# Patient Record
Sex: FEMALE | Race: WHITE | NOT HISPANIC OR LATINO | URBAN - METROPOLITAN AREA
[De-identification: names, ages, dates, MRNs, and addresses within clinical notes are randomized per-mention and may not be internally consistent; named-entity substitution may affect disease eponyms.]

---

## 2022-11-09 ENCOUNTER — OFFICE VISIT (OUTPATIENT)
Dept: FAMILY MEDICINE CLINIC | Facility: CLINIC | Age: 82
End: 2022-11-09

## 2022-11-09 VITALS
RESPIRATION RATE: 14 BRPM | OXYGEN SATURATION: 98 % | DIASTOLIC BLOOD PRESSURE: 82 MMHG | HEART RATE: 62 BPM | HEIGHT: 62 IN | SYSTOLIC BLOOD PRESSURE: 136 MMHG | TEMPERATURE: 98.2 F | BODY MASS INDEX: 26.43 KG/M2 | WEIGHT: 143.6 LBS

## 2022-11-09 DIAGNOSIS — Z00.00 MEDICARE ANNUAL WELLNESS VISIT, SUBSEQUENT: Primary | ICD-10-CM

## 2022-11-09 DIAGNOSIS — E78.5 HYPERLIPIDEMIA, UNSPECIFIED HYPERLIPIDEMIA TYPE: ICD-10-CM

## 2022-11-09 DIAGNOSIS — Z13.6 SCREENING FOR CARDIOVASCULAR CONDITION: ICD-10-CM

## 2022-11-09 DIAGNOSIS — R32 URINARY INCONTINENCE, UNSPECIFIED TYPE: ICD-10-CM

## 2022-11-09 RX ORDER — BROMFENAC 1.03 MG/ML
SOLUTION/ DROPS OPHTHALMIC
COMMUNITY
Start: 2022-10-22

## 2022-11-09 RX ORDER — PANTOPRAZOLE SODIUM 40 MG/1
40 TABLET, DELAYED RELEASE ORAL
COMMUNITY
Start: 2022-09-11

## 2022-11-09 RX ORDER — SIMVASTATIN 40 MG
40 TABLET ORAL DAILY
COMMUNITY
Start: 2022-09-11

## 2022-11-09 RX ORDER — LISINOPRIL 20 MG/1
20 TABLET ORAL DAILY
COMMUNITY
Start: 2022-09-11 | End: 2023-05-10 | Stop reason: SDUPTHER

## 2022-11-09 NOTE — PROGRESS NOTES
Assessment and Plan:     Problem List Items Addressed This Visit    None     Visit Diagnoses     Medicare annual wellness visit, subsequent    -  Primary    Screening for cardiovascular condition        Relevant Orders    CBC    Comprehensive metabolic panel    UA (URINE) with reflex to Scope    Hyperlipidemia, unspecified hyperlipidemia type        Relevant Orders    Lipid panel    Urinary incontinence, unspecified type            Medicare wellness visit completed   Labs ordered  She is up-to-date on health maintenance she is declining osteoporosis screening   She states she is up-to-date on her vaccines   She can follow up in 6 months or sooner if needed      BMI Counseling: Body mass index is 26 52 kg/m²  Follow-up plan was not completed due to elderly patient (70 years old) where weight reduction/weight gain would complicate underlying health condition such as: illness or physical disability  Depression Screening and Follow-up Plan: Patient was screened for depression during today's encounter  They screened negative with a PHQ-2 score of 0  Urinary Incontinence Plan of Care: counseling topics discussed: practice Kegel (pelvic floor strengthening) exercises  Preventive health issues were discussed with patient, and age appropriate screening tests were ordered as noted in patient's After Visit Summary  Personalized health advice and appropriate referrals for health education or preventive services given if needed, as noted in patient's After Visit Summary       History of Present Illness:     Patient presents for a Medicare Wellness Visit    Patient presents today to establish   She states she is due for her Medicare wellness visit as well   She has no acute complaints today she we do not have any old records to review but she says she has very minimal past medical history she had   Eye surgery at one point but otherwise has no other issues today     Patient Care Team:  Pebbles Roberts DO as PCP - General (Family Medicine)     Review of Systems:     Review of Systems   Constitutional: Negative  HENT: Negative  Eyes: Negative  Respiratory: Negative  Cardiovascular: Negative  Gastrointestinal: Negative  Endocrine: Negative  Genitourinary: Negative  Musculoskeletal: Negative  Skin: Negative  Allergic/Immunologic: Negative  Neurological: Negative  Hematological: Negative  Psychiatric/Behavioral: Negative  All other systems reviewed and are negative  Problem List:     There is no problem list on file for this patient  Past Medical and Surgical History:     History reviewed  No pertinent past medical history  History reviewed  No pertinent surgical history  Family History:     History reviewed  No pertinent family history  Social History:     Social History     Socioeconomic History   • Marital status:      Spouse name: None   • Number of children: None   • Years of education: None   • Highest education level: None   Occupational History   • None   Tobacco Use   • Smoking status: Former Smoker     Quit date:      Years since quittin 8   • Smokeless tobacco: Never Used   Vaping Use   • Vaping Use: Never used   Substance and Sexual Activity   • Alcohol use: Yes     Comment: 1 glass of wine a day   • Drug use: Never   • Sexual activity: None   Other Topics Concern   • None   Social History Narrative   • None     Social Determinants of Health     Financial Resource Strain: Low Risk    • Difficulty of Paying Living Expenses: Not very hard   Food Insecurity: Not on file   Transportation Needs: No Transportation Needs   • Lack of Transportation (Medical): No   • Lack of Transportation (Non-Medical): No   Physical Activity: Not on file   Stress: Not on file   Social Connections: Not on file   Intimate Partner Violence: Not At Risk   • Fear of Current or Ex-Partner: No   • Emotionally Abused: No   • Physically Abused: No   • Sexually Abused:  No Housing Stability: Not on file      Medications and Allergies:     Current Outpatient Medications   Medication Sig Dispense Refill   • Bromfenac Sodium, Once-Daily, 0 09 % SOLN INSTILL 1 DROP INTO RIGHT EYE ONCE A DAY     • lisinopril (ZESTRIL) 20 mg tablet Take 20 mg by mouth daily     • pantoprazole (PROTONIX) 40 mg tablet Take 40 mg by mouth daily before breakfast 30 minutes prior     • simvastatin (ZOCOR) 40 mg tablet Take 40 mg by mouth daily       No current facility-administered medications for this visit  No Known Allergies   Immunizations: There is no immunization history on file for this patient  Health Maintenance: There are no preventive care reminders to display for this patient  Topic Date Due   • COVID-19 Vaccine (1) Never done   • Pneumococcal Vaccine: 65+ Years (1 - PCV) Never done   • Influenza Vaccine (1) Never done      Medicare Screening Tests and Risk Assessments:     Elizabeth Grover is here for her Subsequent Wellness visit  Last Medicare Wellness visit information reviewed, patient interviewed and updates made to the record today  Health Risk Assessment:   Patient rates overall health as good  Patient feels that their physical health rating is same  Patient is satisfied with their life  Eyesight was rated as same  Hearing was rated as same  Patient feels that their emotional and mental health rating is same  Patients states they are never, rarely angry  Patient states they are never, rarely unusually tired/fatigued  Pain experienced in the last 7 days has been none  Patient states that she has experienced no weight loss or gain in last 6 months  Depression Screening:   PHQ-2 Score: 0      Fall Risk Screening: In the past year, patient has experienced: no history of falling in past year      Urinary Incontinence Screening:   Patient has not leaked urine accidently in the last six months       Home Safety:  Patient does not have trouble with stairs inside or outside of their home  Patient has working smoke alarms and has working carbon monoxide detector  Home safety hazards include: none  Nutrition:   Current diet is Regular  Medications:   Patient is currently taking over-the-counter supplements  OTC medications include: see medication list  Patient is able to manage medications  Activities of Daily Living (ADLs)/Instrumental Activities of Daily Living (IADLs):   Walk and transfer into and out of bed and chair?: Yes  Dress and groom yourself?: Yes    Bathe or shower yourself?: Yes    Feed yourself? Yes  Do your laundry/housekeeping?: Yes  Manage your money, pay your bills and track your expenses?: Yes  Make your own meals?: Yes    Do your own shopping?: Yes    Previous Hospitalizations:   Any hospitalizations or ED visits within the last 12 months?: No      Advance Care Planning:   Living will: Yes    Durable POA for healthcare:  Yes    Advanced directive: Yes    Advanced directive counseling given: Yes    Five wishes given: No    End of Life Decisions reviewed with patient: Yes    Provider agrees with end of life decisions: Yes      Cognitive Screening:   Provider or family/friend/caregiver concerned regarding cognition?: No    PREVENTIVE SCREENINGS      Cardiovascular Screening:    General: History Lipid Disorder and Risks and Benefits Discussed    Due for: Lipid Panel      Diabetes Screening:     General: Risks and Benefits Discussed    Due for: Blood Glucose      Colorectal Cancer Screening:     General: Screening Not Indicated      Breast Cancer Screening:     General: Screening Not Indicated      Cervical Cancer Screening:    General: Screening Not Indicated      Osteoporosis Screening:    General: Patient Declines      Abdominal Aortic Aneurysm (AAA) Screening:        General: Screening Not Indicated      Lung Cancer Screening:     General: Screening Not Indicated      Hepatitis C Screening:    General: Screening Not Indicated    Screening, Brief Intervention, and Referral to Treatment (SBIRT)    Screening    Typical number of drinks in a week: 6    Single Item Drug Screening:  How often have you used an illegal drug (including marijuana) or a prescription medication for non-medical reasons in the past year? never    Single Item Drug Screen Score: 0  Interpretation: Negative screen for possible drug use disorder    Brief Intervention  Alcohol & drug use screenings were reviewed  No concerns regarding substance use disorder identified  Other Counseling Topics:   Car/seat belt/driving safety, skin self-exam, sunscreen and regular weightbearing exercise and calcium and vitamin D intake  Visual Acuity Screening    Right eye Left eye Both eyes   Without correction:      With correction: 20/200 20/40 20/40        Physical Exam:     /82 (BP Location: Left arm, Patient Position: Sitting, Cuff Size: Large)   Pulse 62   Temp 98 2 °F (36 8 °C)   Resp 14   Ht 5' 1 7" (1 567 m)   Wt 65 1 kg (143 lb 9 6 oz)   SpO2 98%   BMI 26 52 kg/m²     Physical Exam  Constitutional:       Appearance: She is well-developed  HENT:      Head: Normocephalic  Right Ear: External ear normal       Left Ear: External ear normal       Nose: Nose normal    Eyes:      Conjunctiva/sclera: Conjunctivae normal       Pupils: Pupils are equal, round, and reactive to light  Cardiovascular:      Rate and Rhythm: Normal rate and regular rhythm  Heart sounds: Normal heart sounds  Pulmonary:      Effort: Pulmonary effort is normal       Breath sounds: Normal breath sounds  Abdominal:      General: Bowel sounds are normal       Palpations: Abdomen is soft  Musculoskeletal:         General: Normal range of motion  Cervical back: Normal range of motion and neck supple  Skin:     General: Skin is warm and dry  Neurological:      Mental Status: She is alert and oriented to person, place, and time     Psychiatric:         Behavior: Behavior normal          Thought Content: Thought content normal          Judgment: Judgment normal           Pierce Page, DO  BMI Counseling: Body mass index is 26 52 kg/m²  The BMI is above normal  No BMI follow-up plan is appropriate  Patient is 70 years old and weight reduction/weight gain would further complicate their underlying illness

## 2022-11-15 LAB
ALBUMIN SERPL-MCNC: 4.4 G/DL (ref 3.6–4.6)
ALBUMIN/GLOB SERPL: 2 {RATIO} (ref 1.2–2.2)
ALP SERPL-CCNC: 73 IU/L (ref 44–121)
ALT SERPL-CCNC: 13 IU/L (ref 0–32)
APPEARANCE UR: CLEAR
AST SERPL-CCNC: 28 IU/L (ref 0–40)
BACTERIA URNS QL MICRO: NORMAL
BILIRUB SERPL-MCNC: 0.4 MG/DL (ref 0–1.2)
BILIRUB UR QL STRIP: NEGATIVE
BUN SERPL-MCNC: 29 MG/DL (ref 8–27)
BUN/CREAT SERPL: 21 (ref 12–28)
CALCIUM SERPL-MCNC: 10.5 MG/DL (ref 8.7–10.3)
CASTS URNS QL MICRO: NORMAL /LPF
CHLORIDE SERPL-SCNC: 108 MMOL/L (ref 96–106)
CHOLEST SERPL-MCNC: 185 MG/DL (ref 100–199)
CHOLEST/HDLC SERPL: 2.2 RATIO (ref 0–4.4)
CO2 SERPL-SCNC: 24 MMOL/L (ref 20–29)
COLOR UR: YELLOW
CREAT SERPL-MCNC: 1.35 MG/DL (ref 0.57–1)
EGFR: 39 ML/MIN/1.73
EPI CELLS #/AREA URNS HPF: NORMAL /HPF (ref 0–10)
ERYTHROCYTE [DISTWIDTH] IN BLOOD BY AUTOMATED COUNT: 12.3 % (ref 11.7–15.4)
GLOBULIN SER-MCNC: 2.2 G/DL (ref 1.5–4.5)
GLUCOSE SERPL-MCNC: 100 MG/DL (ref 70–99)
GLUCOSE UR QL: NEGATIVE
HCT VFR BLD AUTO: 32.4 % (ref 34–46.6)
HDLC SERPL-MCNC: 84 MG/DL
HGB BLD-MCNC: 11.2 G/DL (ref 11.1–15.9)
HGB UR QL STRIP: NEGATIVE
KETONES UR QL STRIP: NEGATIVE
LDLC SERPL CALC-MCNC: 88 MG/DL (ref 0–99)
LEUKOCYTE ESTERASE UR QL STRIP: ABNORMAL
MCH RBC QN AUTO: 30.5 PG (ref 26.6–33)
MCHC RBC AUTO-ENTMCNC: 34.6 G/DL (ref 31.5–35.7)
MCV RBC AUTO: 88 FL (ref 79–97)
MICRO URNS: ABNORMAL
NITRITE UR QL STRIP: NEGATIVE
PH UR STRIP: 6 [PH] (ref 5–7.5)
PLATELET # BLD AUTO: 209 X10E3/UL (ref 150–450)
POTASSIUM SERPL-SCNC: 5.4 MMOL/L (ref 3.5–5.2)
PROT SERPL-MCNC: 6.6 G/DL (ref 6–8.5)
PROT UR QL STRIP: NEGATIVE
RBC # BLD AUTO: 3.67 X10E6/UL (ref 3.77–5.28)
RBC #/AREA URNS HPF: NORMAL /HPF (ref 0–2)
SL AMB VLDL CHOLESTEROL CALC: 13 MG/DL (ref 5–40)
SODIUM SERPL-SCNC: 142 MMOL/L (ref 134–144)
SP GR UR: 1.01 (ref 1–1.03)
TRIGL SERPL-MCNC: 70 MG/DL (ref 0–149)
UROBILINOGEN UR STRIP-ACNC: 0.2 MG/DL (ref 0.2–1)
WBC # BLD AUTO: 4.8 X10E3/UL (ref 3.4–10.8)
WBC #/AREA URNS HPF: NORMAL /HPF (ref 0–5)

## 2023-05-10 ENCOUNTER — OFFICE VISIT (OUTPATIENT)
Dept: FAMILY MEDICINE CLINIC | Facility: CLINIC | Age: 83
End: 2023-05-10

## 2023-05-10 VITALS
HEART RATE: 59 BPM | WEIGHT: 141.2 LBS | HEIGHT: 62 IN | RESPIRATION RATE: 14 BRPM | BODY MASS INDEX: 25.98 KG/M2 | DIASTOLIC BLOOD PRESSURE: 88 MMHG | OXYGEN SATURATION: 98 % | SYSTOLIC BLOOD PRESSURE: 138 MMHG | TEMPERATURE: 98 F

## 2023-05-10 DIAGNOSIS — I10 BENIGN ESSENTIAL HYPERTENSION: ICD-10-CM

## 2023-05-10 DIAGNOSIS — E78.2 MIXED HYPERLIPIDEMIA: Primary | ICD-10-CM

## 2023-05-10 DIAGNOSIS — N18.30 STAGE 3 CHRONIC KIDNEY DISEASE, UNSPECIFIED WHETHER STAGE 3A OR 3B CKD (HCC): ICD-10-CM

## 2023-05-10 DIAGNOSIS — M54.31 SCIATICA OF RIGHT SIDE: ICD-10-CM

## 2023-05-10 PROBLEM — Z01.818 PRE-OP EVALUATION: Status: ACTIVE | Noted: 2020-09-29

## 2023-05-10 PROBLEM — R73.01 FASTING HYPERGLYCEMIA: Status: ACTIVE | Noted: 2020-07-02

## 2023-05-10 PROBLEM — Z00.00 ENCOUNTER FOR MEDICARE ANNUAL WELLNESS EXAM: Status: ACTIVE | Noted: 2021-08-03

## 2023-05-10 PROBLEM — M18.0 PRIMARY OSTEOARTHRITIS OF BOTH FIRST CARPOMETACARPAL JOINTS: Status: ACTIVE | Noted: 2019-12-30

## 2023-05-10 PROBLEM — H40.9 GLAUCOMA: Status: ACTIVE | Noted: 2018-10-30

## 2023-05-10 PROBLEM — M54.50 LOW BACK PAIN: Status: ACTIVE | Noted: 2019-11-18

## 2023-05-10 RX ORDER — LISINOPRIL 20 MG/1
20 TABLET ORAL DAILY
Qty: 90 TABLET | Refills: 1 | Status: SHIPPED | OUTPATIENT
Start: 2023-05-10

## 2023-05-10 NOTE — PROGRESS NOTES
"    Assessment/Plan:       Diagnoses and all orders for this visit:    Mixed hyperlipidemia    Benign essential hypertension  -     lisinopril (ZESTRIL) 20 mg tablet; Take 1 tablet (20 mg total) by mouth daily    Sciatica of right side  -     Ambulatory Referral to Physical Therapy; Future    Stage 3 chronic kidney disease, unspecified whether stage 3a or 3b CKD ([facility] [address] )        Overall patient is doing well she will continue current medications  Patient referred to physical therapy for her sciatic issues  Her other chronic conditions are stable and she can follow-up in 6 months for Medicare wellness visit or sooner if needed    Subjective:     Chief Complaint   Patient presents with   • Follow-up     6 month        Patient ID: Carlos Vaughn is a 78 y o  female  Patient presents today for 6-month checkup  She is having some sciatic type pain that starts in her right buttock and radiates down her leg  Otherwise her other chronic conditions are stable and she has no other complaints      The following portions of the patient's history were reviewed and updated as appropriate: allergies, current medications, past family history, past medical history, past social history, past surgical history and problem list     Review of Systems   Constitutional: Negative  HENT: Negative  Eyes: Negative  Respiratory: Negative  Cardiovascular: Negative  Gastrointestinal: Negative  Endocrine: Negative  Genitourinary: Negative  Musculoskeletal: Negative  Skin: Negative  Allergic/Immunologic: Negative  Neurological: Negative  Hematological: Negative  Psychiatric/Behavioral: Negative  All other systems reviewed and are negative          Objective:    Vitals:    05/10/23 1006   BP: 138/88   BP Location: Left arm   Patient Position: Sitting   Cuff Size: Standard   Pulse: 59   Resp: 14   Temp: 98 °F (36 7 °C)   SpO2: 98%   Weight: 64 kg (141 lb 3 2 oz)   Height: 5' 1 7\" (1 567 m)          Physical " Exam  Vitals and nursing note reviewed  Constitutional:       Appearance: She is well-developed  HENT:      Head: Normocephalic and atraumatic  Right Ear: External ear normal       Left Ear: External ear normal    Eyes:      Conjunctiva/sclera: Conjunctivae normal       Pupils: Pupils are equal, round, and reactive to light  Cardiovascular:      Rate and Rhythm: Normal rate and regular rhythm  Heart sounds: Normal heart sounds  Pulmonary:      Effort: Pulmonary effort is normal       Breath sounds: Normal breath sounds  Abdominal:      General: Bowel sounds are normal       Palpations: Abdomen is soft  Musculoskeletal:         General: Normal range of motion  Cervical back: Normal range of motion  Skin:     General: Skin is warm and dry  Neurological:      Mental Status: She is alert and oriented to person, place, and time  Deep Tendon Reflexes: Reflexes are normal and symmetric  Psychiatric:         Behavior: Behavior normal          Thought Content:  Thought content normal          Judgment: Judgment normal

## 2023-05-26 ENCOUNTER — EVALUATION (OUTPATIENT)
Dept: PHYSICAL THERAPY | Facility: CLINIC | Age: 83
End: 2023-05-26

## 2023-05-26 DIAGNOSIS — M54.31 SCIATICA OF RIGHT SIDE: Primary | ICD-10-CM

## 2023-05-26 NOTE — PROGRESS NOTES
PT Evaluation     Today's date: 2023  Patient name: Beth Kamara  : 1940  MRN: [id number]  Referring provider: Teodoro Dietz DO  Dx:   Encounter Diagnosis     ICD-10-CM    1  Sciatica of right side  M54 31 Ambulatory Referral to Physical Therapy                     Assessment  Assessment details: Beth Kamara is a 78 y o  female who presents with pain, decreased strength, decreased ROM, decreased joint mobility and ambulatory dysfunction  Due to these impairments, patient has difficulty performing a/iadls, recreational activities and engaging in social activities  Patient's clinical presentation is consistent with their referring diagnosis of Sciatica of right side  Patient has been educated in home exercise program and plan of care  Patient would benefit from skilled physical therapy services to address their aforementioned functional limitations and progress towards prior level of function and independence with home exercise program    Impairments: abnormal gait, abnormal muscle firing, abnormal or restricted ROM, activity intolerance, impaired physical strength, lacks appropriate home exercise program, pain with function, weight-bearing intolerance and poor body mechanics    Symptom irritability: lowUnderstanding of Dx/Px/POC: good   Prognosis: good    Goals  Short Term Goals: Target Date 4 weeks  1  Pt will initiate and advance HEP  2  Pt will have < 3/10 pain  3  Pt will have full arom of the l/s with out pain  4  Pt will be able to sit to stand with out difficulty    Long Term Goals: Target Date 8 weeks  1  Pt will demonstrate independence in HEP  2  Pt will have <1/10 pain  3  Pt will have full core and B LE strength  4   Pt will be able to negotiate stairs with out difficulty      Plan  Patient would benefit from: skilled PT  Planned modality interventions: cryotherapy and thermotherapy: hydrocollator packs  Planned therapy interventions: joint mobilization, manual therapy, patient education, postural training, activity modification, abdominal trunk stabilization, body mechanics training, flexibility, functional ROM exercises, graded exercise, home exercise program, neuromuscular re-education, strengthening, stretching, therapeutic activities, therapeutic exercise, motor coordination training, balance/weight bearing training and ADL training  Frequency: 1x week  Duration in weeks: 8  Plan of Care beginning date: 2023  Plan of Care expiration date: 2023  Treatment plan discussed with: patient        Subjective Evaluation    History of Present Illness  Mechanism of injury: Pt notes that she has been having right gluteal pain for about 1 month  She notes that there was no injury  She does get some l/s pain ,but mainly gluteal pain that radiates down the post lateral leg to just below the knee  Pt notes that when she is sitting no pain  Worst pain is with transition to standing after sitting, and then when doing stair ascent  Pt notes that she does still going to the gym 3 days per week for 30 min ea  Has had to modify her activity b/c of pain, but still able to go  Pt notes that sleeping is not disturbed  No changes to b/b behavior  Pain  Current pain ratin  At best pain ratin  At worst pain ratin  Location: right glute, l/s  Quality: radiating, discomfort, sharp and dull ache    Patient Goals  Patient goals for therapy: decreased pain, increased motion, independence with ADLs/IADLs, increased strength and return to sport/leisure activities  Patient goal: return to gym with out pain        Objective     Concurrent Complaints  Negative for night pain, disturbed sleep, bladder dysfunction, bowel dysfunction, saddle (S4) numbness, cardiac problem, kidney problem, gallbladder problem, stomach problem, ulcer, appendix problem, spleen problem, pancreas problem, history of cancer, history of trauma and infection    Static Posture     Lumbar Spine   Lumbar spine (Left): Rotated       Hip   Hip (Left): Internally rotated  Hip (Right): Externally rotated  Active Range of Motion     Lumbar   Flexion:  WFL  Extension:  WFL  Left lateral flexion:  Restriction level: maximal  Right lateral flexion:  Restriction level: maximal  Left rotation:  WFL  Right rotation:  WFL and with pain    Strength/Myotome Testing     Left Hip   Planes of Motion   Flexion: 4-    Right Hip   Planes of Motion   Flexion: 4-    Left Knee   Flexion: 4+  Extension: 4+    Right Knee   Flexion: 4+  Extension: 4+    Left Ankle/Foot   Dorsiflexion: 4+  Plantar flexion: 4    Right Ankle/Foot   Dorsiflexion: 4+  Plantar flexion: 4    Muscle Activation   Patient able to activate left transverse abdominals, left external obliques, left internal obliques, left multifidus, right transverse abdominals, right external obliques, right internal obliques and right multifidus  Tests     Lumbar   Positive prone instability   Left   Negative crossed SLR, passive SLR and slump test      Right   Negative crossed SLR, passive SLR and slump test      Ambulation     Observational Gait   Gait: antalgic and asymmetric   Increased left stance time and right swing time  Decreased right stance time and left swing time  Functional Assessment      Squat    Sitting toward left side, left valgus and right valgus       Posterior weight shift: moderate    Depth of femur height: above 90 degrees             Precautions: none      Manuals 5/26            Right piriformis stm nv            L/s pa and rot mobs nv lv 2                                      Neuro Re-Ed             Ppt march Red nv            Ppt bridge nv            Ppt ball bridge nv            S/l clams nv                                                   Ther Ex             bike nv                                                                                                       Ther Activity                                       Gait Training Modalities

## 2023-06-02 ENCOUNTER — OFFICE VISIT (OUTPATIENT)
Dept: PHYSICAL THERAPY | Facility: CLINIC | Age: 83
End: 2023-06-02

## 2023-06-02 ENCOUNTER — APPOINTMENT (OUTPATIENT)
Dept: PHYSICAL THERAPY | Facility: CLINIC | Age: 83
End: 2023-06-02
Payer: COMMERCIAL

## 2023-06-02 DIAGNOSIS — M54.31 SCIATICA OF RIGHT SIDE: Primary | ICD-10-CM

## 2023-06-02 NOTE — PROGRESS NOTES
Daily Note     Today's date: 2023  Patient name: Ramos Garcia  : 1940  MRN: [id number]  Referring provider: Kerri Mclaughlin DO  Dx:   Encounter Diagnosis     ICD-10-CM    1  Sciatica of right side  M54 31                      Subjective: pt notes that she has been doing better  Her back pain has been less as well as the leg pain  She does note that her 20 min ride here did cause her to have some glute pain on the right side  Objective: See treatment diary below      Assessment: pt did well with all exercises  Will continue to progress with improved tolerance to stretching and strengthening of core and glutes      Plan: Continue per plan of care        Precautions: none      Manuals            Right piriformis stm nv DB           L/s pa and rot mobs nv lv 2 lv 2 DB                                      Neuro Re-Ed             Ppt march Red nv Red x10           Ppt bridge nv Red 2x10           Ppt ball bridge nv nv           S/l clams nv 3x10           Supine hip add  5''x15           Supine hip abd  Red 2x10                        Ther Ex             bike nv nv                                                                                                      Ther Activity                                       Gait Training                                       Modalities             HP  10'

## 2023-06-05 ENCOUNTER — OFFICE VISIT (OUTPATIENT)
Dept: PHYSICAL THERAPY | Facility: CLINIC | Age: 83
End: 2023-06-05
Payer: COMMERCIAL

## 2023-06-05 DIAGNOSIS — M54.31 SCIATICA OF RIGHT SIDE: Primary | ICD-10-CM

## 2023-06-05 PROCEDURE — 97112 NEUROMUSCULAR REEDUCATION: CPT

## 2023-06-05 PROCEDURE — 97140 MANUAL THERAPY 1/> REGIONS: CPT

## 2023-06-05 NOTE — PROGRESS NOTES
"Daily Note     Today's date: 2023  Patient name: Geovanna Marshall  : 1940  MRN: [id number]  Referring provider: Lebron Allred DO  Dx:   Encounter Diagnosis     ICD-10-CM    1  Sciatica of right side  M54 31                      Subjective: pt reports that her sxs are better, she still gets sxs when she gets out of car that go down her right leg takes a few minutes to centralize and they are not as intense as before      Objective: See treatment diary below      Assessment: Tolerated treatment with some cramping in hamstrings when performing bridges  Patient was able to perform exercises in good form otherwise  Did update HEP today  Pt was tender and tight in right glute and piriformis  Plan: Continue per plan of care        Precautions: none      Manuals           Right piriformis stm nv DB +glute-DL          L/s pa and rot mobs nv lv 2 lv 2 DB  nv                                    Neuro Re-Ed             Ppt march Red nv Red x10 Red 2x10          Ppt bridge nv Red 2x10 Red x10          Ppt ball bridge nv nv nv          S/l clams nv 3x10 3x10          Supine hip add  5''x15 5\" 2x10          Supine hip abd  Red 2x10 Red 2x10                       Ther Ex             bike nv nv           Piriformis stretch   20\"x5                                                                                        Ther Activity                                       Gait Training                                       Modalities             HP  10'                             "

## 2023-06-09 ENCOUNTER — APPOINTMENT (OUTPATIENT)
Dept: PHYSICAL THERAPY | Facility: CLINIC | Age: 83
End: 2023-06-09
Payer: COMMERCIAL

## 2023-06-12 ENCOUNTER — OFFICE VISIT (OUTPATIENT)
Dept: PHYSICAL THERAPY | Facility: CLINIC | Age: 83
End: 2023-06-12
Payer: COMMERCIAL

## 2023-06-12 DIAGNOSIS — M54.31 SCIATICA OF RIGHT SIDE: Primary | ICD-10-CM

## 2023-06-12 PROCEDURE — 97110 THERAPEUTIC EXERCISES: CPT

## 2023-06-12 PROCEDURE — 97112 NEUROMUSCULAR REEDUCATION: CPT

## 2023-06-12 PROCEDURE — 97140 MANUAL THERAPY 1/> REGIONS: CPT

## 2023-06-12 NOTE — PROGRESS NOTES
"Daily Note     Today's date: 2023  Patient name: Javier Multani  : 1940  MRN: [id number]  Referring provider: Hernando Vargas DO  Dx:   Encounter Diagnosis     ICD-10-CM    1  Sciatica of right side  M54 31           Start Time: 1142  Stop Time: 1227  Total time in clinic (min): 45 minutes    Subjective: Patient continues with mild radicular sxs during car transfers  Objective: See treatment diary below  Bike added this visit with good tolerance  Assessment: Tolerated treatment well  Moderate challenge with banded supine marches R>L, with weakness in interanal rotators/ adductors  Good response to manuals with improved mobility in gait post  Patient demonstrated fatigue post treatment, exhibited good technique with therapeutic exercises and would benefit from continued PT to improve LE strength and core stability  Plan: Continue per plan of care        Precautions: none      Manuals          Right piriformis stm nv DB +glute-DL LQ, glute, lumbar paraspinals         L/s pa and rot mobs nv lv 2 lv 2 DB  nv NV                                   Neuro Re-Ed             Ppt march Red nv Red x10 Red 2x10 Red 2x10         Ppt bridge nv Red 2x10 Red x10 Red 2x10         Ppt ball bridge nv nv nv          S/l clams nv 3x10 3x10 3x10ea         Supine hip add  5''x15 5\" 2x10 5\" 2x10         Supine hip abd  Red 2x10 Red 2x10 Red 2x10                      Ther Ex             bike nv nv  5 min         Piriformis stretch   20\"x5 20\"x5ea, and post SL clams                                                                                       Ther Activity                                       Gait Training                                       Modalities             HP  10'                             "

## 2023-06-16 ENCOUNTER — APPOINTMENT (OUTPATIENT)
Dept: PHYSICAL THERAPY | Facility: CLINIC | Age: 83
End: 2023-06-16
Payer: COMMERCIAL

## 2023-06-19 ENCOUNTER — OFFICE VISIT (OUTPATIENT)
Dept: PHYSICAL THERAPY | Facility: CLINIC | Age: 83
End: 2023-06-19
Payer: COMMERCIAL

## 2023-06-19 DIAGNOSIS — M54.31 SCIATICA OF RIGHT SIDE: Primary | ICD-10-CM

## 2023-06-19 PROCEDURE — 97112 NEUROMUSCULAR REEDUCATION: CPT

## 2023-06-19 PROCEDURE — 97140 MANUAL THERAPY 1/> REGIONS: CPT

## 2023-06-19 NOTE — PROGRESS NOTES
"Daily Note     Today's date: 2023  Patient name: Brittany Butler  : 1940  MRN: [id number]  Referring provider: Madelyn Smith DO  Dx:   Encounter Diagnosis     ICD-10-CM    1  Sciatica of right side  M54 31           Start Time:   Stop Time: 1230  Total time in clinic (min): 45 minutes    Subjective: Patient states that bike increased her sciatic sxs on R       Objective: See treatment diary below      Assessment: Tolerated treatment well  Session initiated with QL stretched and progressed to manuals  Good response to manuals with radicular sxs abolished  Bike held this visit Patient demonstrated fatigue post treatment, exhibited good technique with therapeutic exercises and would benefit from continued PT to improve core strength,      Plan: Continue per plan of care  Precautions: none      Manuals         Right piriformis stm nv DB +glute-DL LQ, glute, lumbar paraspinals LQ, glute        L/s pa and rot mobs nv lv 2 lv 2 DB  nv NV                                   Neuro Re-Ed             Ppt march Red nv Red x10 Red 2x10 Red 2x10 Red 2x10        Ppt bridge nv Red 2x10 Red x10 Red 2x10 Red 2x10        Ppt ball bridge nv nv nv          S/l clams nv 3x10 3x10 3x10ea 3x10ea        Supine hip add  5''x15 5\" 2x10 5\" 2x10 5\" 2x10        Supine hip abd  Red 2x10 Red 2x10 Red 2x10 Red 2x10                      Ther Ex             bike nv nv  5 min HOLD pain!         Piriformis stretch   20\"x5 20\"x5ea, and post SL clams 20\"x5ea, and post SL clams                                                                                      Ther Activity                                       Gait Training                                       Modalities             HP  10'                             "

## 2023-06-23 ENCOUNTER — APPOINTMENT (OUTPATIENT)
Dept: PHYSICAL THERAPY | Facility: CLINIC | Age: 83
End: 2023-06-23
Payer: COMMERCIAL

## 2023-06-26 ENCOUNTER — OFFICE VISIT (OUTPATIENT)
Dept: PHYSICAL THERAPY | Facility: CLINIC | Age: 83
End: 2023-06-26
Payer: COMMERCIAL

## 2023-06-26 DIAGNOSIS — M54.31 SCIATICA OF RIGHT SIDE: Primary | ICD-10-CM

## 2023-06-26 PROCEDURE — 97140 MANUAL THERAPY 1/> REGIONS: CPT

## 2023-06-26 PROCEDURE — 97110 THERAPEUTIC EXERCISES: CPT

## 2023-06-26 NOTE — PROGRESS NOTES
"Daily Note     Today's date: 2023  Patient name: Tim Calhoun  : 1940  MRN: [id number]  Referring provider: Lima Gutierrez DO  Dx:   Encounter Diagnosis     ICD-10-CM    1  Sciatica of right side  M54 31                      Subjective: pt is frustrated as she notes that HEP does help sxs but is short lived and still gets sxs often  Compliance is usually 1 time a day  As she has to lie down to perform and sometimes forgets    Objective: See treatment diary below      Assessment:Changed treatment slightly to include seated stretches for patient to perform throughout the day and in more accessible positions    Patient is to continue with her normal HEP 1 time a day and then add seated QL, piriformis and sciatic sliders several times throughout day  Will assess if performing more often through day helps with decreasing sxs and getting up out of chair with less sxs  Plan: Continue per plan of care  Precautions: none      Manuals        Right piriformis stm nv DB +glute-DL LQ, glute, lumbar paraspinals LQ, glute DL       L/s pa and rot mobs nv lv 2 lv 2 DB  nv NV                                   Neuro Re-Ed             Ppt march Red nv Red x10 Red 2x10 Red 2x10 Red 2x10        Ppt bridge nv Red 2x10 Red x10 Red 2x10 Red 2x10        Seated sciatic slider nv nv nv   x10       S/l clams nv 3x10 3x10 3x10ea 3x10ea        Supine hip add  5''x15 5\" 2x10 5\" 2x10 5\" 2x10        Supine hip abd  Red 2x10 Red 2x10 Red 2x10 Red 2x10         ppt      5\"x20       Ther Ex             bike nv nv  5 min HOLD pain!         Piriformis stretch   20\"x5 20\"x5ea, and post SL clams 20\"x5ea, and post SL clams 20\"x5 supine and seated       LTR      x10       seated QL stretch      15\"x2                                                           Ther Activity                                       Gait Training                                       Modalities             HP  10'                           "

## 2023-06-30 ENCOUNTER — APPOINTMENT (OUTPATIENT)
Dept: PHYSICAL THERAPY | Facility: CLINIC | Age: 83
End: 2023-06-30
Payer: COMMERCIAL

## 2023-07-09 PROBLEM — Z00.00 ENCOUNTER FOR MEDICARE ANNUAL WELLNESS EXAM: Status: RESOLVED | Noted: 2021-08-03 | Resolved: 2023-07-09

## 2023-07-10 ENCOUNTER — OFFICE VISIT (OUTPATIENT)
Dept: PHYSICAL THERAPY | Facility: CLINIC | Age: 83
End: 2023-07-10
Payer: COMMERCIAL

## 2023-07-10 DIAGNOSIS — M54.31 SCIATICA OF RIGHT SIDE: Primary | ICD-10-CM

## 2023-07-10 PROCEDURE — 97112 NEUROMUSCULAR REEDUCATION: CPT

## 2023-07-10 PROCEDURE — 97140 MANUAL THERAPY 1/> REGIONS: CPT

## 2023-07-10 PROCEDURE — 97110 THERAPEUTIC EXERCISES: CPT

## 2023-07-10 NOTE — PROGRESS NOTES
Daily Note     Today's date: 7/10/2023  Patient name: Maria Del Carmen Carbone  : 1940  MRN: [id number]  Referring provider: Emery Horne DO  Dx:   Encounter Diagnosis     ICD-10-CM    1. Sciatica of right side  M54.31                      Subjective: pt notes that she is feeling better with less buttock pain but still with some lateral thigh pain but unsure if that is from knee or back. She states that stairs are easier now also. Objective: See treatment diary below      Assessment: Tolerated treatment well with most issue when performing right piriformis stretch in sitting position as she notes some pain with this. Pt was better when having shorter hold time on this. . Patient would benefit from continued PT      Plan: Continue per plan of care. RA nv     Precautions: none      Manuals  7/10      Right piriformis stm nv DB +glute-DL LQ, glute, lumbar paraspinals LQ, glute DL + glute/lateral thigh-DL      L/s pa and rot mobs nv lv 2 lv 2 DB  nv NV                                   Neuro Re-Ed             Ppt march Red nv Red x10 Red 2x10 Red 2x10 Red 2x10  2x10      Ppt bridge nv Red 2x10 Red x10 Red 2x10 Red 2x10  Red 2x10      Seated sciatic slider nv nv nv   x10       S/l clams nv 3x10 3x10 3x10ea 3x10ea        Supine hip add  5''x15 5" 2x10 5" 2x10 5" 2x10  5"x20      Supine hip abd  Red 2x10 Red 2x10 Red 2x10 Red 2x10   Red 2x10      ppt      5"x20 5"x20      Ther Ex             bike nv nv  5 min HOLD pain!         Piriformis stretch   20"x5 20"x5ea, and post SL clams 20"x5ea, and post SL clams 20"x5 supine and seated Supine  20"x5, seated 10"x5      LTR      x10 x10      seated QL stretch      15"x2 20"x5                                                          Ther Activity                                       Gait Training                                       Modalities             HP  10'

## 2023-07-18 ENCOUNTER — EVALUATION (OUTPATIENT)
Dept: PHYSICAL THERAPY | Facility: CLINIC | Age: 83
End: 2023-07-18
Payer: COMMERCIAL

## 2023-07-18 DIAGNOSIS — M54.31 SCIATICA OF RIGHT SIDE: Primary | ICD-10-CM

## 2023-07-18 PROCEDURE — 97110 THERAPEUTIC EXERCISES: CPT | Performed by: PHYSICAL THERAPIST

## 2023-07-18 PROCEDURE — 97140 MANUAL THERAPY 1/> REGIONS: CPT | Performed by: PHYSICAL THERAPIST

## 2023-07-18 PROCEDURE — 97112 NEUROMUSCULAR REEDUCATION: CPT | Performed by: PHYSICAL THERAPIST

## 2023-07-18 NOTE — PROGRESS NOTES
PT Evaluation     Today's date: 2023  Patient name: Anyi Loyola  : 1940  MRN: [id number]  Referring provider: Radha Richardson DO  Dx:   Encounter Diagnosis     ICD-10-CM    1. Sciatica of right side  M54.31                      Assessment  Assessment details: Anyi Loyola has been compliant with attending PT and home exercise program since initial eval.  Reyes Olds  has made improvements in objective data since initial eval but is still limited compared to prior level of function. Reyes Olds continues with above listed impairments and would benefit from additional skilled PT to address these deficits to return to prior level of function. Impairments: abnormal gait, abnormal muscle firing, abnormal or restricted ROM, activity intolerance, impaired physical strength, lacks appropriate home exercise program, pain with function, weight-bearing intolerance and poor body mechanics    Symptom irritability: lowUnderstanding of Dx/Px/POC: good   Prognosis: good    Goals  Short Term Goals: Target Date 4 weeks  1. Pt will initiate and advance HEP. 2. Pt will have < 3/10 pain met  3. Pt will have full arom of the l/s with out pain met  4. Pt will be able to sit to stand with out difficulty met    Long Term Goals: Target Date 8 weeks  1. Pt will demonstrate independence in HEP. 2. Pt will have <1/10 pain not met  3. Pt will have full core and B LE strength in progress  4.  Pt will be able to negotiate stairs with out difficulty [id number] [facility]  Patient would benefit from: skilled PT  Planned modality interventions: cryotherapy and thermotherapy: hydrocollator packs  Planned therapy interventions: joint mobilization, manual therapy, patient education, postural training, activity modification, abdominal trunk stabilization, body mechanics training, flexibility, functional ROM exercises, graded exercise, home exercise program, neuromuscular re-education, strengthening, stretching, therapeutic activities, therapeutic exercise, motor coordination training, balance/weight bearing training and ADL training  Frequency: 1x week  Duration in weeks: 8  Plan of Care beginning date: 2023  Plan of Care expiration date: 2023  Treatment plan discussed with: patient        Subjective Evaluation    History of Present Illness  Mechanism of injury: Pt notes that pain freq and intensity have improved. But still some lateral right leg pain with transition to standing from prolonged sitting position. Patient Goals  Patient goals for therapy: decreased pain, increased motion, independence with ADLs/IADLs, increased strength and return to sport/leisure activities  Patient goal: return to gym with out pain  Pain  Current pain ratin  At best pain ratin  At worst pain rating: 3  Location: right glute, l/s  Quality: radiating, discomfort, sharp and dull ache          Objective     Concurrent Complaints  Negative for night pain, disturbed sleep, bladder dysfunction, bowel dysfunction, saddle (S4) numbness, cardiac problem, kidney problem, gallbladder problem, stomach problem, ulcer, appendix problem, spleen problem, pancreas problem, history of cancer, history of trauma and infection    Static Posture     Lumbar Spine   Lumbar spine (Left): Rotated. Hip   Hip (Left): Not internally rotated. Hip (Right): Not externally rotated.     Active Range of Motion     Lumbar   Flexion:  WFL  Extension:  WFL  Left lateral flexion:  Restriction level: moderate  Right lateral flexion:  Restriction level: moderate  Left rotation:  WFL  Right rotation:  [facility]    Strength/Myotome Testing     Left Hip   Planes of Motion   Flexion: 4-    Right Hip   Planes of Motion   Flexion: 4-    Left Knee   Flexion: 4+  Extension: 4+    Right Knee   Flexion: 4+  Extension: 4+    Left Ankle/Foot   Dorsiflexion: 4+  Plantar flexion: 4    Right Ankle/Foot   Dorsiflexion: 4+  Plantar flexion: 4    Muscle Activation   Patient able to activate left external obliques, left internal obliques, left multifidus, right external obliques, right internal obliques and right multifidus. Patient unable to activate left transverse abdominals and right transverse abdominals. Tests     Lumbar   Negative prone instability . Left   Negative crossed SLR, passive SLR and slump test.     Right   Negative crossed SLR, passive SLR and slump test.     Ambulation     Observational Gait   Left stance time, right stance time, left swing time and right swing time within functional limits. Functional Assessment      Squat    Left valgus and right valgus. Not sitting toward left side. Posterior weight shift: moderate    Depth of femur height: to 90 degrees             Precautions: none    Manuals 5/26 6/2 6/5 6/12 6/19 6/26 7/10 7/18     Right piriformis stm nv DB +glute-DL LQ, glute, lumbar paraspinals LQ, glute DL + glute/lateral thigh-DL DB + glute     L/s pa and rot mobs nv lv 2 lv 2 DB  nv NV                                   Neuro Re-Ed             Ppt march Red nv Red x10 Red 2x10 Red 2x10 Red 2x10  2x10 Red 2x10     Ppt bridge nv Red 2x10 Red x10 Red 2x10 Red 2x10  Red 2x10 Red 2x10     Ball bridge        2x10      S/l clams nv 3x10 3x10 3x10ea 3x10ea        Supine hip add  5''x15 5" 2x10 5" 2x10 5" 2x10  5"x20      Supine hip abd  Red 2x10 Red 2x10 Red 2x10 Red 2x10   Red 2x10 S/l red 2x10     ppt      5"x20 5"x20      Ther Ex             bike nv nv  5 min HOLD pain!         Piriformis stretch   20"x5 20"x5ea, and post SL clams 20"x5ea, and post SL clams 20"x5 supine and seated Supine  20"x5, seated 10"x5 supine 20''x5 seated 10''x5     LTR      x10 x10 supine ql str     seated QL stretch      15"x2 20"x5                                                          Ther Activity                                       Gait Training                                       Modalities             HP  10'

## 2023-07-25 ENCOUNTER — APPOINTMENT (OUTPATIENT)
Dept: PHYSICAL THERAPY | Facility: CLINIC | Age: 83
End: 2023-07-25
Payer: COMMERCIAL

## 2023-07-27 ENCOUNTER — OFFICE VISIT (OUTPATIENT)
Dept: PHYSICAL THERAPY | Facility: CLINIC | Age: 83
End: 2023-07-27
Payer: COMMERCIAL

## 2023-07-27 DIAGNOSIS — M54.31 SCIATICA OF RIGHT SIDE: Primary | ICD-10-CM

## 2023-07-27 PROCEDURE — 97110 THERAPEUTIC EXERCISES: CPT

## 2023-07-27 PROCEDURE — 97112 NEUROMUSCULAR REEDUCATION: CPT

## 2023-07-27 PROCEDURE — 97140 MANUAL THERAPY 1/> REGIONS: CPT

## 2023-07-27 NOTE — PROGRESS NOTES
Daily Note     Today's date: 2023  Patient name: Aleena Amato  : 1940  MRN: [id number]  Referring provider: Samir Blevins DO  Dx:   Encounter Diagnosis     ICD-10-CM    1. Sciatica of right side  M54.31                      Subjective: pt states that she continues with feeling better. Objective: See treatment diary below      Assessment: Tolerated treatment with less tightness in glutes and decreased tenderness reported. . Patient was able to advance to standing DLS exercises of reverse chop palloff press and ball press with increased core activation in a standing position      Plan: Continue per plan of care. Precautions: none    Manuals  6/2 6/5 6/12 6/19 6/26 7/10 7/18 7/27    Right piriformis stm nv DB +glute-DL LQ, glute, lumbar paraspinals LQ, glute DL + glute/lateral thigh-DL DB + glute DL+ glute    L/s pa and rot mobs nv lv 2 lv 2 DB  nv NV                                   Neuro Re-Ed             Ppt march Red nv Red x10 Red 2x10 Red 2x10 Red 2x10  2x10 Red 2x10 Red 2x10    Ppt bridge nv Red 2x10 Red x10 Red 2x10 Red 2x10  Red 2x10 Red 2x10 Red 2x10    Ball bridge        2x10      S/l clams nv 3x10 3x10 3x10ea 3x10ea    Red x10    Supine hip add  5''x15 5" 2x10 5" 2x10 5" 2x10  5"x20      Supine hip abd  Red 2x10 Red 2x10 Red 2x10 Red 2x10   Red 2x10 S/l red 2x10 Red x10    Ball press stand         5"x20    paloff press         gtb x10    ppt      5"x20 5"x20      Ther Ex             bike nv nv  5 min HOLD pain!         Piriformis stretch   20"x5 20"x5ea, and post SL clams 20"x5ea, and post SL clams 20"x5 supine and seated Supine  20"x5, seated 10"x5 supine 20''x5 seated 10''x5 supine 20"x5    LTR      x10 x10 supine ql str QL 20"x5    seated QL stretch      15"x2 20"x5      ext         gtb 2x10                                           Ther Activity             Reverse chop         Red ball x10 ea                 Gait Training                                       Modalities HP  10'

## 2023-08-01 ENCOUNTER — OFFICE VISIT (OUTPATIENT)
Dept: PHYSICAL THERAPY | Facility: CLINIC | Age: 83
End: 2023-08-01
Payer: COMMERCIAL

## 2023-08-01 DIAGNOSIS — M54.31 SCIATICA OF RIGHT SIDE: Primary | ICD-10-CM

## 2023-08-01 PROCEDURE — 97140 MANUAL THERAPY 1/> REGIONS: CPT

## 2023-08-01 PROCEDURE — 97110 THERAPEUTIC EXERCISES: CPT

## 2023-08-01 PROCEDURE — 97112 NEUROMUSCULAR REEDUCATION: CPT

## 2023-08-01 NOTE — PROGRESS NOTES
Daily Note     Today's date: 2023  Patient name: Sharon Denson  : 1940  MRN: [id number]  Referring provider: Dwayne August DO  Dx:   Encounter Diagnosis     ICD-10-CM    1. Sciatica of right side  M54.31           Start Time: 1330  Stop Time: 1410  Total time in clinic (min): 40 minutes    Subjective: Patient reports that she has some soreness into Right glut today. Overall she notes improved mobility and decreased pain and improved tolerance to activities during the day. Objective: See treatment diary below      Assessment: Tolerated treatment well. Some cuing for correct posturing throughout session. Patient is challenged by standing TE. Palpable tightness over glut med and piriformis. Patient demonstrated fatigue post treatment, exhibited good technique with therapeutic exercises and would benefit from continued PT      Plan: Continue per plan of care. Progress treatment as tolerated. Precautions: none    Manuals 5/26 6/2 6/5 6/12 6/19 6/26 7/10 7/18 7/27 8/1   Right piriformis stm nv DB +glute-DL LQ, glute, lumbar paraspinals LQ, glute DL + glute/lateral thigh-DL DB + glute DL+ glute JL + glut   L/s pa and rot mobs nv lv 2 lv 2 DB  nv NV                                   Neuro Re-Ed             Ppt march Red nv Red x10 Red 2x10 Red 2x10 Red 2x10  2x10 Red 2x10 Red 2x10 Red 3" 2x10   Ppt bridge nv Red 2x10 Red x10 Red 2x10 Red 2x10  Red 2x10 Red 2x10 Red 2x10 Red 2x10   Ball bridge        2x10      S/l clams nv 3x10 3x10 3x10ea 3x10ea    Red x10 Red 5" x10   Supine hip add  5''x15 5" 2x10 5" 2x10 5" 2x10  5"x20      Supine hip abd  Red 2x10 Red 2x10 Red 2x10 Red 2x10   Red 2x10 S/l red 2x10 Red x10 Red 5" 2x10   Ball press stand         5"x20 5"x20   paloff press         gtb x10 GTB 3" x10   ppt      5"x20 5"x20      Ther Ex             bike nv nv  5 min HOLD pain!         Piriformis stretch   20"x5 20"x5ea, and post SL clams 20"x5ea, and post SL clams 20"x5 supine and seated Supine  20"x5, seated 10"x5 supine 20''x5 seated 10''x5 supine 20"x5 supine 20"x5   LTR      x10 x10 supine ql str QL 20"x5 QL 20"x5   seated QL stretch      15"x2 20"x5      TB Shld ext         gtb 2x10 GTB 3" 2x10                                          Ther Activity             Reverse chop         Red ball x10 ea Red ball x10 ea                Gait Training                                       Modalities             HP  10'

## 2023-08-07 ENCOUNTER — OFFICE VISIT (OUTPATIENT)
Dept: PHYSICAL THERAPY | Facility: CLINIC | Age: 83
End: 2023-08-07
Payer: COMMERCIAL

## 2023-08-07 DIAGNOSIS — M54.31 SCIATICA OF RIGHT SIDE: Primary | ICD-10-CM

## 2023-08-07 PROCEDURE — 97140 MANUAL THERAPY 1/> REGIONS: CPT

## 2023-08-07 PROCEDURE — 97112 NEUROMUSCULAR REEDUCATION: CPT

## 2023-08-07 PROCEDURE — 97110 THERAPEUTIC EXERCISES: CPT

## 2023-08-07 NOTE — PROGRESS NOTES
Daily Note     Today's date: 2023  Patient name: Linda Baker  : 1940  MRN: [id number]  Referring provider: Kennedy Wright DO  Dx:   Encounter Diagnosis     ICD-10-CM    1. Sciatica of right side  M54.31                      Subjective: pt states that she is doing much better and feels good overall. Objective: See treatment diary below      Assessment: Tolerated treatment with no pain during exercises. . Patient needs cues for extension with band. Finalize HEP for discharge. Plan: Potential discharge next visit.      Precautions: none    Manuals    Right piriformis stm DL       DB + glute DL+ glute JL + glut   L/s pa and rot mobs                                       Neuro Re-Ed             Ppt march gtb x20       Red 2x10 Red 2x10 Red 3" 2x10   Ppt bridge gtb x20       Red 2x10 Red 2x10 Red 2x10   Ball bridge        2x10      S/l clams gtb x20        Red x10 Red 5" x10   Supine hip add             Supine hip abd        S/l red 2x10 Red x10 Red 5" 2x10   Ball press stand 5"x20        5"x20 5"x20   paloff press Blue x10ea        gtb x10 GTB 3" x10   ppt             Ther Ex             bike             Piriformis stretch 20"x5       supine 20''x5 seated 10''x5 supine 20"x5 supine 20"x5   QL stretch 20"x5       supine ql str QL 20"x5 QL 20"x5   seated QL stretch             TB Shld ext Blue 2x10         gtb 2x10 GTB 3" 2x10                                          Ther Activity             Reverse chop Red ball x10 ea        Red ball x10 ea Red ball x10 ea   chop             Gait Training                                       Modalities             HP

## 2023-08-14 ENCOUNTER — OFFICE VISIT (OUTPATIENT)
Dept: PHYSICAL THERAPY | Facility: CLINIC | Age: 83
End: 2023-08-14
Payer: COMMERCIAL

## 2023-08-14 DIAGNOSIS — M54.31 SCIATICA OF RIGHT SIDE: Primary | ICD-10-CM

## 2023-08-14 PROCEDURE — 97112 NEUROMUSCULAR REEDUCATION: CPT

## 2023-08-14 PROCEDURE — 97110 THERAPEUTIC EXERCISES: CPT

## 2023-08-14 PROCEDURE — 97140 MANUAL THERAPY 1/> REGIONS: CPT

## 2023-08-14 NOTE — PROGRESS NOTES
Daily Note     Today's date: 2023  Patient name: Mamadou Romeo  : 1940  MRN: [id number]  Referring provider: Bridger Gray DO  Dx:   Encounter Diagnosis     ICD-10-CM    1. Sciatica of right side  M54.31                      Subjective: Pt reports she is doing well, no issues noted      Objective: See treatment diary below      Assessment: Tolerated treatment well. Pt performed all exercises without issue, reviewed HEP exercises with pt and encouraged consistency with them at home. Patient demonstrated fatigue post treatment, exhibited good technique with therapeutic exercises and would benefit from continued PT. Plan: Pt discharged per pt instruction.       Precautions: none    Manuals    Right piriformis stm DL KM      DB + glute DL+ glute JL + glut   L/s pa and rot mobs                                       Neuro Re-Ed             Ppt march gtb x20 gtb x20      Red 2x10 Red 2x10 Red 3" 2x10   Ppt bridge gtb x20 gtb x20      Red 2x10 Red 2x10 Red 2x10   Ball bridge        2x10      S/l clams gtb x20 gtb x20       Red x10 Red 5" x10   Supine hip add             Supine hip abd        S/l red 2x10 Red x10 Red 5" 2x10   Ball press stand 5"x20 5"x20       5"x20 5"x20   paloff press Blue x10ea Blue x10 ea       gtb x10 GTB 3" x10   ppt             Ther Ex             bike             Piriformis stretch 20"x5 20"x5      supine 20''x5 seated 10''x5 supine 20"x5 supine 20"x5   QL stretch 20"x5 20"x5      supine ql str QL 20"x5 QL 20"x5   seated QL stretch             TB Shld ext Blue 2x10  Blue 2x10       gtb 2x10 GTB 3" 2x10                                          Ther Activity             Reverse chop Red ball x10 ea Red ball 10x ea       Red ball x10 ea Red ball x10 ea   chop             Gait Training                                       Modalities             HP

## 2023-10-11 DIAGNOSIS — E78.2 MIXED HYPERLIPIDEMIA: Primary | ICD-10-CM

## 2023-10-11 RX ORDER — SIMVASTATIN 40 MG
40 TABLET ORAL DAILY
Qty: 90 TABLET | Refills: 1 | Status: SHIPPED | OUTPATIENT
Start: 2023-10-11

## 2023-11-11 DIAGNOSIS — I10 BENIGN ESSENTIAL HYPERTENSION: ICD-10-CM

## 2023-11-13 RX ORDER — LISINOPRIL 20 MG/1
20 TABLET ORAL DAILY
Qty: 90 TABLET | Refills: 1 | Status: SHIPPED | OUTPATIENT
Start: 2023-11-13

## 2023-11-17 DIAGNOSIS — K21.9 GASTROESOPHAGEAL REFLUX DISEASE WITHOUT ESOPHAGITIS: Primary | ICD-10-CM

## 2023-11-17 RX ORDER — PANTOPRAZOLE SODIUM 40 MG/1
40 TABLET, DELAYED RELEASE ORAL
Qty: 90 TABLET | Refills: 0 | Status: SHIPPED | OUTPATIENT
Start: 2023-11-17